# Patient Record
Sex: MALE | Race: WHITE | Employment: OTHER | URBAN - METROPOLITAN AREA
[De-identification: names, ages, dates, MRNs, and addresses within clinical notes are randomized per-mention and may not be internally consistent; named-entity substitution may affect disease eponyms.]

---

## 2017-02-28 ENCOUNTER — OFFICE VISIT (OUTPATIENT)
Dept: INTERNAL MEDICINE CLINIC | Facility: CLINIC | Age: 72
End: 2017-02-28

## 2017-02-28 VITALS
TEMPERATURE: 99 F | DIASTOLIC BLOOD PRESSURE: 90 MMHG | SYSTOLIC BLOOD PRESSURE: 150 MMHG | BODY MASS INDEX: 33.62 KG/M2 | HEIGHT: 69 IN | WEIGHT: 227 LBS | HEART RATE: 68 BPM

## 2017-02-28 DIAGNOSIS — Z00.00 ROUTINE HEALTH MAINTENANCE: Primary | ICD-10-CM

## 2017-02-28 DIAGNOSIS — R04.0 EPISTAXIS: ICD-10-CM

## 2017-02-28 DIAGNOSIS — Z12.5 SCREENING FOR PROSTATE CANCER: ICD-10-CM

## 2017-02-28 DIAGNOSIS — R20.2 PARESTHESIA OF BOTH FEET: ICD-10-CM

## 2017-02-28 DIAGNOSIS — E78.00 HYPERCHOLESTEROLEMIA: ICD-10-CM

## 2017-02-28 DIAGNOSIS — I10 ESSENTIAL HYPERTENSION: ICD-10-CM

## 2017-02-28 DIAGNOSIS — Z12.11 SCREENING FOR COLON CANCER: ICD-10-CM

## 2017-02-28 DIAGNOSIS — F17.210 SMOKING GREATER THAN 40 PACK YEARS: ICD-10-CM

## 2017-02-28 PROCEDURE — G0009 ADMIN PNEUMOCOCCAL VACCINE: HCPCS | Performed by: INTERNAL MEDICINE

## 2017-02-28 PROCEDURE — 99397 PER PM REEVAL EST PAT 65+ YR: CPT | Performed by: INTERNAL MEDICINE

## 2017-02-28 PROCEDURE — 93005 ELECTROCARDIOGRAM TRACING: CPT | Performed by: INTERNAL MEDICINE

## 2017-02-28 PROCEDURE — 93010 ELECTROCARDIOGRAM REPORT: CPT | Performed by: INTERNAL MEDICINE

## 2017-02-28 PROCEDURE — 90670 PCV13 VACCINE IM: CPT | Performed by: INTERNAL MEDICINE

## 2017-02-28 RX ORDER — LOSARTAN POTASSIUM 50 MG/1
50 TABLET ORAL DAILY
Qty: 30 TABLET | Refills: 6 | Status: SHIPPED | OUTPATIENT
Start: 2017-02-28 | End: 2017-03-08 | Stop reason: DRUGHIGH

## 2017-02-28 RX ORDER — FLUTICASONE PROPIONATE 50 MCG
SPRAY, SUSPENSION (ML) NASAL DAILY
COMMUNITY

## 2017-02-28 NOTE — PROGRESS NOTES
Abby Joya is a 73year old malePatient presents with:  Establish Care    HPI:     Abby Joya is a 73year old male who presents for a complete physical exam.   Here to establish. Prev saw Dr. Jeaneth Cr.   Referred by daughter Brigida Velez  Had frequent e changes in stream  NEURO: denies headaches or dizziness      EXAM:   /90 mmHg  Pulse 68  Temp(Src) 98.5 °F (36.9 °C) (Oral)  Ht 5' 9\" (1.753 m)  Wt 227 lb (102.967 kg)  BMI 33.51 kg/m2  GENERAL: well developed, well nourished, in no apparent distres

## 2017-03-01 ENCOUNTER — LAB ENCOUNTER (OUTPATIENT)
Dept: LAB | Age: 72
End: 2017-03-01
Attending: INTERNAL MEDICINE
Payer: MEDICARE

## 2017-03-01 DIAGNOSIS — R04.0 EPISTAXIS: ICD-10-CM

## 2017-03-01 DIAGNOSIS — E78.00 HYPERCHOLESTEROLEMIA: ICD-10-CM

## 2017-03-01 DIAGNOSIS — Z12.5 SCREENING FOR PROSTATE CANCER: ICD-10-CM

## 2017-03-01 DIAGNOSIS — R20.2 PARESTHESIA OF BOTH FEET: ICD-10-CM

## 2017-03-01 LAB
ALBUMIN SERPL BCP-MCNC: 3.6 G/DL (ref 3.5–4.8)
ALBUMIN/GLOB SERPL: 0.8 {RATIO} (ref 1–2)
ALP SERPL-CCNC: 47 U/L (ref 32–100)
ALT SERPL-CCNC: 24 U/L (ref 17–63)
ANION GAP SERPL CALC-SCNC: 7 MMOL/L (ref 0–18)
AST SERPL-CCNC: 18 U/L (ref 15–41)
BASOPHILS # BLD: 0 K/UL (ref 0–0.2)
BASOPHILS NFR BLD: 1 %
BILIRUB SERPL-MCNC: 0.7 MG/DL (ref 0.3–1.2)
BUN SERPL-MCNC: 13 MG/DL (ref 8–20)
BUN/CREAT SERPL: 11.4 (ref 10–20)
CALCIUM SERPL-MCNC: 9.1 MG/DL (ref 8.5–10.5)
CHLORIDE SERPL-SCNC: 103 MMOL/L (ref 95–110)
CHOLEST SERPL-MCNC: 166 MG/DL (ref 110–200)
CO2 SERPL-SCNC: 28 MMOL/L (ref 22–32)
CREAT SERPL-MCNC: 1.14 MG/DL (ref 0.5–1.5)
EOSINOPHIL # BLD: 0.2 K/UL (ref 0–0.7)
EOSINOPHIL NFR BLD: 4 %
ERYTHROCYTE [DISTWIDTH] IN BLOOD BY AUTOMATED COUNT: 14 % (ref 11–15)
GLOBULIN PLAS-MCNC: 4.3 G/DL (ref 2.5–3.7)
GLUCOSE SERPL-MCNC: 112 MG/DL (ref 70–99)
HBA1C MFR BLD: 5.9 % (ref 4–6)
HCT VFR BLD AUTO: 44.3 % (ref 41–52)
HDLC SERPL-MCNC: 29 MG/DL
HGB BLD-MCNC: 15 G/DL (ref 13.5–17.5)
LDLC SERPL CALC-MCNC: 101 MG/DL (ref 0–99)
LYMPHOCYTES # BLD: 2.3 K/UL (ref 1–4)
LYMPHOCYTES NFR BLD: 44 %
MCH RBC QN AUTO: 31.1 PG (ref 27–32)
MCHC RBC AUTO-ENTMCNC: 33.9 G/DL (ref 32–37)
MCV RBC AUTO: 91.6 FL (ref 80–100)
MONOCYTES # BLD: 0.4 K/UL (ref 0–1)
MONOCYTES NFR BLD: 7 %
NEUTROPHILS # BLD AUTO: 2.3 K/UL (ref 1.8–7.7)
NEUTROPHILS NFR BLD: 44 %
NONHDLC SERPL-MCNC: 137 MG/DL
OSMOLALITY UR CALC.SUM OF ELEC: 287 MOSM/KG (ref 275–295)
PLATELET # BLD AUTO: 138 K/UL (ref 140–400)
PMV BLD AUTO: 8.4 FL (ref 7.4–10.3)
POTASSIUM SERPL-SCNC: 3.9 MMOL/L (ref 3.3–5.1)
PROT SERPL-MCNC: 7.9 G/DL (ref 5.9–8.4)
PSA SERPL-MCNC: 1.3 NG/ML (ref 0–4)
RBC # BLD AUTO: 4.84 M/UL (ref 4.5–5.9)
SODIUM SERPL-SCNC: 138 MMOL/L (ref 136–144)
TRIGL SERPL-MCNC: 182 MG/DL (ref 1–149)
TSH SERPL-ACNC: 1.82 UIU/ML (ref 0.34–5.6)
VIT B12 SERPL-MCNC: 206 PG/ML (ref 181–914)
WBC # BLD AUTO: 5.1 K/UL (ref 4–11)

## 2017-03-01 PROCEDURE — 36415 COLL VENOUS BLD VENIPUNCTURE: CPT

## 2017-03-01 PROCEDURE — 85025 COMPLETE CBC W/AUTO DIFF WBC: CPT

## 2017-03-01 PROCEDURE — 80053 COMPREHEN METABOLIC PANEL: CPT

## 2017-03-01 PROCEDURE — 80061 LIPID PANEL: CPT

## 2017-03-01 PROCEDURE — 82607 VITAMIN B-12: CPT

## 2017-03-01 PROCEDURE — 83036 HEMOGLOBIN GLYCOSYLATED A1C: CPT | Performed by: INTERNAL MEDICINE

## 2017-03-01 PROCEDURE — 84443 ASSAY THYROID STIM HORMONE: CPT

## 2017-03-08 ENCOUNTER — OFFICE VISIT (OUTPATIENT)
Dept: INTERNAL MEDICINE CLINIC | Facility: CLINIC | Age: 72
End: 2017-03-08

## 2017-03-08 VITALS
HEART RATE: 64 BPM | WEIGHT: 226 LBS | DIASTOLIC BLOOD PRESSURE: 92 MMHG | SYSTOLIC BLOOD PRESSURE: 142 MMHG | HEIGHT: 69 IN | TEMPERATURE: 98 F | BODY MASS INDEX: 33.47 KG/M2

## 2017-03-08 DIAGNOSIS — D69.6 THROMBOCYTOPENIA (HCC): ICD-10-CM

## 2017-03-08 DIAGNOSIS — E78.00 HYPERCHOLESTEROLEMIA: ICD-10-CM

## 2017-03-08 DIAGNOSIS — E53.8 VITAMIN B12 DEFICIENCY: ICD-10-CM

## 2017-03-08 DIAGNOSIS — R73.03 PREDIABETES: ICD-10-CM

## 2017-03-08 DIAGNOSIS — R73.9 HYPERGLYCEMIA: ICD-10-CM

## 2017-03-08 DIAGNOSIS — I10 ESSENTIAL HYPERTENSION: Primary | ICD-10-CM

## 2017-03-08 PROCEDURE — G0463 HOSPITAL OUTPT CLINIC VISIT: HCPCS | Performed by: INTERNAL MEDICINE

## 2017-03-08 PROCEDURE — 99215 OFFICE O/P EST HI 40 MIN: CPT | Performed by: INTERNAL MEDICINE

## 2017-03-08 RX ORDER — LOSARTAN POTASSIUM 100 MG/1
100 TABLET ORAL DAILY
Qty: 30 TABLET | Refills: 11 | Status: SHIPPED | OUTPATIENT
Start: 2017-03-08 | End: 2018-03-13

## 2017-03-08 RX ORDER — ASPIRIN 81 MG/1
81 TABLET ORAL DAILY
Qty: 1 TABLET | Refills: 0 | COMMUNITY
Start: 2017-03-08

## 2017-03-08 RX ORDER — ATORVASTATIN CALCIUM 20 MG/1
20 TABLET, FILM COATED ORAL NIGHTLY
Qty: 30 TABLET | Refills: 11 | Status: SHIPPED | OUTPATIENT
Start: 2017-03-08 | End: 2018-03-19

## 2017-03-08 RX ORDER — MELATONIN
1000 DAILY
Qty: 30 TABLET | Refills: 0 | COMMUNITY
Start: 2017-03-08

## 2017-03-08 NOTE — PROGRESS NOTES
Raymundo Woods is a 73year old male. Patient presents with: Follow - Up: Patient is here today for a 1 week f/u. His blood pressure has been elevated at home. Patient denies any CP, dizziness, SOB, or HA's at this time. HPI:     Here for f/u of HTN.   Vi with multiple cardiac risk factors -- HTN, smoking, prediabetes, age. Recommend Lipitor 20mg/day. Repeat lipids in 3 months. 5. Thrombocytopenia  Platelets slightly low 138 -- check repeat CBC in 3 mos. 45 min spent reviewing labs, counseling.     R

## 2017-06-21 ENCOUNTER — OFFICE VISIT (OUTPATIENT)
Dept: INTERNAL MEDICINE CLINIC | Facility: CLINIC | Age: 72
End: 2017-06-21

## 2017-06-21 VITALS
BODY MASS INDEX: 33.18 KG/M2 | HEART RATE: 57 BPM | WEIGHT: 224 LBS | OXYGEN SATURATION: 96 % | SYSTOLIC BLOOD PRESSURE: 142 MMHG | DIASTOLIC BLOOD PRESSURE: 88 MMHG | HEIGHT: 69 IN | TEMPERATURE: 98 F

## 2017-06-21 DIAGNOSIS — I10 ESSENTIAL HYPERTENSION: Primary | ICD-10-CM

## 2017-06-21 DIAGNOSIS — E53.8 VITAMIN B12 DEFICIENCY: ICD-10-CM

## 2017-06-21 DIAGNOSIS — E78.00 HYPERCHOLESTEROLEMIA: ICD-10-CM

## 2017-06-21 DIAGNOSIS — R73.03 PREDIABETES: ICD-10-CM

## 2017-06-21 PROCEDURE — 99214 OFFICE O/P EST MOD 30 MIN: CPT | Performed by: INTERNAL MEDICINE

## 2017-06-21 PROCEDURE — G0463 HOSPITAL OUTPT CLINIC VISIT: HCPCS | Performed by: INTERNAL MEDICINE

## 2017-06-21 RX ORDER — AMLODIPINE BESYLATE 5 MG/1
5 TABLET ORAL DAILY
Qty: 30 TABLET | Refills: 11 | Status: SHIPPED | OUTPATIENT
Start: 2017-06-21 | End: 2017-07-12

## 2017-06-21 NOTE — PROGRESS NOTES
Snow Andrews is a 73year old male. Patient presents with:  Checkup: Patient presents for 3 month OV. HPI:     Here for follow up. BP at home 150's/90's. Did not get labs done in [place]. Feels okay. Paresthesias in feet are less.          Current Outpati hypertension  On losartan 100mg/day since 3/2017 -- did not do the follow up BMP as ordered. Add norvasc 5mg daily. 2. Prediabetes  Pt reminded to do repeat HgbA1c. Discussed low carb diet, wt loss at length.  Reminded to see Dr. Kenia osorio.

## 2017-06-22 ENCOUNTER — LAB ENCOUNTER (OUTPATIENT)
Dept: LAB | Age: 72
End: 2017-06-22
Attending: INTERNAL MEDICINE
Payer: MEDICARE

## 2017-06-22 DIAGNOSIS — E78.00 HYPERCHOLESTEROLEMIA: ICD-10-CM

## 2017-06-22 DIAGNOSIS — I10 ESSENTIAL HYPERTENSION: ICD-10-CM

## 2017-06-22 DIAGNOSIS — E53.8 VITAMIN B12 DEFICIENCY: ICD-10-CM

## 2017-06-22 DIAGNOSIS — R73.9 HYPERGLYCEMIA: ICD-10-CM

## 2017-06-22 DIAGNOSIS — D69.6 THROMBOCYTOPENIA (HCC): ICD-10-CM

## 2017-06-22 PROCEDURE — 80061 LIPID PANEL: CPT

## 2017-06-22 PROCEDURE — 82043 UR ALBUMIN QUANTITATIVE: CPT

## 2017-06-22 PROCEDURE — 36415 COLL VENOUS BLD VENIPUNCTURE: CPT

## 2017-06-22 PROCEDURE — 82570 ASSAY OF URINE CREATININE: CPT

## 2017-06-22 PROCEDURE — 85025 COMPLETE CBC W/AUTO DIFF WBC: CPT

## 2017-06-22 PROCEDURE — 83036 HEMOGLOBIN GLYCOSYLATED A1C: CPT

## 2017-06-22 PROCEDURE — 84460 ALANINE AMINO (ALT) (SGPT): CPT

## 2017-06-22 PROCEDURE — 82607 VITAMIN B-12: CPT

## 2017-06-22 PROCEDURE — 84450 TRANSFERASE (AST) (SGOT): CPT

## 2017-06-22 PROCEDURE — 80048 BASIC METABOLIC PNL TOTAL CA: CPT

## 2017-07-05 ENCOUNTER — TELEPHONE (OUTPATIENT)
Dept: INTERNAL MEDICINE CLINIC | Facility: CLINIC | Age: 72
End: 2017-07-05

## 2017-07-05 DIAGNOSIS — R73.9 HYPERGLYCEMIA: Primary | ICD-10-CM

## 2017-07-05 DIAGNOSIS — E78.00 HYPERCHOLESTEROLEMIA: ICD-10-CM

## 2017-07-05 DIAGNOSIS — I10 ESSENTIAL HYPERTENSION: ICD-10-CM

## 2017-07-05 DIAGNOSIS — E53.8 VITAMIN B12 DEFICIENCY: ICD-10-CM

## 2017-07-05 DIAGNOSIS — Z11.59 NEED FOR HEPATITIS C SCREENING TEST: ICD-10-CM

## 2017-07-05 NOTE — TELEPHONE ENCOUNTER
Please let pt know his labs were much better. Vit B12 level better -- continue the daily vitamin B12. Lipids at goal on Lipitor. Platelets normalized (may have been low previously due to his low B12 level). HgbA1c stable at 5.9.   Pt has another appt in

## 2017-07-06 ENCOUNTER — OFFICE VISIT (OUTPATIENT)
Dept: OTOLARYNGOLOGY | Facility: CLINIC | Age: 72
End: 2017-07-06

## 2017-07-06 VITALS
HEART RATE: 66 BPM | WEIGHT: 225 LBS | DIASTOLIC BLOOD PRESSURE: 57 MMHG | TEMPERATURE: 98 F | HEIGHT: 69 IN | BODY MASS INDEX: 33.33 KG/M2 | RESPIRATION RATE: 16 BRPM | SYSTOLIC BLOOD PRESSURE: 107 MMHG

## 2017-07-06 DIAGNOSIS — R04.0 EPISTAXIS: Primary | ICD-10-CM

## 2017-07-06 PROCEDURE — G0463 HOSPITAL OUTPT CLINIC VISIT: HCPCS | Performed by: OTOLARYNGOLOGY

## 2017-07-06 PROCEDURE — 99203 OFFICE O/P NEW LOW 30 MIN: CPT | Performed by: OTOLARYNGOLOGY

## 2017-07-07 NOTE — PATIENT INSTRUCTIONS
Nosebleed (Adult)    Bleeding from the nose most commonly occurs because of injury or drying and cracking of the inner lining of the nose. Most nosebleeds are because of dry air or nose-picking. They can occur during a common cold or an allergy attack.  Parmjit Krishnamurthy · If the bleeding starts again, sit up and lean forward to prevent swallowing blood. Pinch your nose tightly on both sides, as shown above, for 10 to 15 minutes. Time yourself. Don’t release the pressure on your nose until 10 minutes is up.  If bleeding norton

## 2017-07-07 NOTE — PROGRESS NOTES
Shelia Fritz is a 73year old male. Patient presents with:  Nose Problem: Patient c/o epitaxis to bilateral nostrils for past 5 years. HPI:   For many years he's been experiencing problems with blood and crusted material in his nose.  This causes him so 225 lb (102.1 kg)   BMI 33.23 kg/m²   System Findings Details   Skin Normal Inspection - Normal.   Constitutional Normal Overall appearance - Normal.   Head/Face Normal Facial features - Normal. Eyebrows - Normal. Skull - Normal.   Oral/Oropharynx Normal L

## 2017-07-11 ENCOUNTER — TELEPHONE (OUTPATIENT)
Dept: INTERNAL MEDICINE CLINIC | Facility: CLINIC | Age: 72
End: 2017-07-11

## 2017-07-11 DIAGNOSIS — R73.03 PREDIABETES: Primary | ICD-10-CM

## 2017-07-11 NOTE — TELEPHONE ENCOUNTER
Pt is being seen by Dr Fabiano Larsen now pt has [employer] HMO he needs a referral please fax [phone], phone [phone]    Tasked to nursing high   Message given to [facility]

## 2017-07-11 NOTE — TELEPHONE ENCOUNTER
Referral entered per Dr. Ashwin Arrieta last OV note. Auto-authorized for [facility] patient. Faxed to requested number.

## 2017-07-12 RX ORDER — AMLODIPINE BESYLATE 5 MG/1
10 TABLET ORAL DAILY
Qty: 180 TABLET | Refills: 3 | Status: SHIPPED | OUTPATIENT
Start: 2017-07-12 | End: 2017-09-20 | Stop reason: DRUGHIGH

## 2017-07-12 NOTE — TELEPHONE ENCOUNTER
Please let pt know that I received his BP readings -- better but still high (130's-140's/high 80's-90's). I'd like him to increase the amlodipine from 5 to 10mg/day. Continue the losartan 100mg/day. He may need a Rx for the 10mg amlodipine tablets.   Ple

## 2017-07-12 NOTE — TELEPHONE ENCOUNTER
Pt notified b/p better but still high / DR LUCERO   To increase amlodipine from 5 mg to 10 mg daily   Continue losartan 100 mg daily  New rx sent for amlodipine 10 mg sent to [facility]  Please send b/p reading in 2-3 wks to DR LUCERO   Pt understands all  instructions

## 2017-07-25 ENCOUNTER — OFFICE VISIT (OUTPATIENT)
Dept: OTOLARYNGOLOGY | Facility: CLINIC | Age: 72
End: 2017-07-25

## 2017-07-25 VITALS
HEIGHT: 69 IN | SYSTOLIC BLOOD PRESSURE: 106 MMHG | WEIGHT: 225 LBS | BODY MASS INDEX: 33.33 KG/M2 | DIASTOLIC BLOOD PRESSURE: 70 MMHG | TEMPERATURE: 98 F

## 2017-07-25 DIAGNOSIS — R04.0 EPISTAXIS: Primary | ICD-10-CM

## 2017-07-25 PROCEDURE — G0463 HOSPITAL OUTPT CLINIC VISIT: HCPCS | Performed by: OTOLARYNGOLOGY

## 2017-07-25 PROCEDURE — 99213 OFFICE O/P EST LOW 20 MIN: CPT | Performed by: OTOLARYNGOLOGY

## 2017-07-25 NOTE — PROGRESS NOTES
Chong Davila is a 73year old male. Patient presents with: Follow - Up: 3 week follow up- epistaxis- per pt he still experienced nose bleed    HPI:   He has been doing much better. The bleeding has decreased signifiantly.      Current Outpatient Prescripti less crusting. , Turbinates - Normal   Neurological Normal Memory - Normal. Cranial nerves - Cranial nerves II through XII grossly intact.    Neck Exam Normal Inspection - Normal. Palpation - Normal. Parotid gland - Normal. Thyroid gland - Normal.   Psychia

## 2017-07-25 NOTE — PATIENT INSTRUCTIONS
Nosebleed (Adult)    Bleeding from the nose most commonly occurs because of injury or drying and cracking of the inner lining of the nose. Most nosebleeds are because of dry air or nose-picking. They can occur during a common cold or an allergy attack.  Dionne French · If the bleeding starts again, sit up and lean forward to prevent swallowing blood. Pinch your nose tightly on both sides, as shown above, for 10 to 15 minutes. Time yourself. Don’t release the pressure on your nose until 10 minutes is up.  If bleeding norton

## 2017-07-27 PROCEDURE — 88305 TISSUE EXAM BY PATHOLOGIST: CPT | Performed by: INTERNAL MEDICINE

## 2017-08-01 PROBLEM — Z86.010 HISTORY OF ADENOMATOUS POLYP OF COLON: Status: ACTIVE | Noted: 2017-08-01

## 2017-09-20 ENCOUNTER — OFFICE VISIT (OUTPATIENT)
Dept: INTERNAL MEDICINE CLINIC | Facility: CLINIC | Age: 72
End: 2017-09-20

## 2017-09-20 VITALS
BODY MASS INDEX: 33.18 KG/M2 | TEMPERATURE: 99 F | SYSTOLIC BLOOD PRESSURE: 102 MMHG | HEART RATE: 78 BPM | WEIGHT: 224 LBS | DIASTOLIC BLOOD PRESSURE: 76 MMHG | HEIGHT: 69 IN

## 2017-09-20 DIAGNOSIS — R06.83 SNORING: ICD-10-CM

## 2017-09-20 DIAGNOSIS — I10 ESSENTIAL HYPERTENSION: Primary | ICD-10-CM

## 2017-09-20 PROCEDURE — G0463 HOSPITAL OUTPT CLINIC VISIT: HCPCS | Performed by: INTERNAL MEDICINE

## 2017-09-20 PROCEDURE — 99214 OFFICE O/P EST MOD 30 MIN: CPT | Performed by: INTERNAL MEDICINE

## 2017-09-20 RX ORDER — AMLODIPINE BESYLATE 10 MG/1
10 TABLET ORAL DAILY
Qty: 90 TABLET | Refills: 3 | Status: SHIPPED | OUTPATIENT
Start: 2017-09-20 | End: 2018-10-14

## 2017-09-20 NOTE — PROGRESS NOTES
Aleksey Joyce is a 73year old male. Patient presents with:  Blood Pressure: patient states he is feeling fine    HPI:     Here with his wife for f/u of BP. Norvasc was increased from 5 to 10mg/day.   BP at home 120's-140's/80's-90's, although his BP machine index is 33.08 kg/m².       EXAM:   /76 (BP Location: Left arm, Patient Position: Sitting, Cuff Size: large)   Pulse 78   Temp 98.7 °F (37.1 °C) (Oral)   Ht 5' 9\" (1.753 m)   Wt 224 lb (101.6 kg)   BMI 33.08 kg/m²   GENERAL: well developed, well nour

## 2017-12-01 ENCOUNTER — TELEPHONE (OUTPATIENT)
Dept: INTERNAL MEDICINE CLINIC | Facility: CLINIC | Age: 72
End: 2017-12-01

## 2017-12-01 NOTE — TELEPHONE ENCOUNTER
To Dr. Levon May - see below - these are 2 most recent episodes, have occurred on occasion in past as well. Please advise.

## 2017-12-01 NOTE — TELEPHONE ENCOUNTER
Py has history of HTN and currently takes losartan 100mg/day and norvasc 10mg/day; advise hold losartan if light-headedness persists, and advise see Dr Woody Donaldson next week for follow-up if sxs persist; please call pt; FYI to Dr Woody Donaldson; to nursing--> please call pt

## 2017-12-01 NOTE — TELEPHONE ENCOUNTER
Please call pt  Has been taking BP, reading today normal 117/74, but when he got up after taking he was light headed  Sunday also felt that way after riding in car for awhile and then standing up  Please call to discuss/advise  Tasked to nursing

## 2017-12-18 ENCOUNTER — TELEPHONE (OUTPATIENT)
Dept: INTERNAL MEDICINE CLINIC | Facility: CLINIC | Age: 72
End: 2017-12-18

## 2017-12-18 ENCOUNTER — APPOINTMENT (OUTPATIENT)
Dept: LAB | Age: 72
End: 2017-12-18
Attending: INTERNAL MEDICINE
Payer: MEDICARE

## 2017-12-18 ENCOUNTER — LAB ENCOUNTER (OUTPATIENT)
Dept: LAB | Age: 72
End: 2017-12-18
Attending: INTERNAL MEDICINE
Payer: MEDICARE

## 2017-12-18 DIAGNOSIS — E78.00 HYPERCHOLESTEROLEMIA: ICD-10-CM

## 2017-12-18 DIAGNOSIS — I10 ESSENTIAL HYPERTENSION: ICD-10-CM

## 2017-12-18 DIAGNOSIS — Z11.59 NEED FOR HEPATITIS C SCREENING TEST: ICD-10-CM

## 2017-12-18 DIAGNOSIS — R73.9 HYPERGLYCEMIA: ICD-10-CM

## 2017-12-18 DIAGNOSIS — R82.90 ABNORMAL URINALYSIS: ICD-10-CM

## 2017-12-18 DIAGNOSIS — R82.90 ABNORMAL URINALYSIS: Primary | ICD-10-CM

## 2017-12-18 PROCEDURE — 80048 BASIC METABOLIC PNL TOTAL CA: CPT

## 2017-12-18 PROCEDURE — 82570 ASSAY OF URINE CREATININE: CPT

## 2017-12-18 PROCEDURE — 81001 URINALYSIS AUTO W/SCOPE: CPT

## 2017-12-18 PROCEDURE — 36415 COLL VENOUS BLD VENIPUNCTURE: CPT

## 2017-12-18 PROCEDURE — 82043 UR ALBUMIN QUANTITATIVE: CPT

## 2017-12-18 PROCEDURE — 80061 LIPID PANEL: CPT

## 2017-12-18 PROCEDURE — 86803 HEPATITIS C AB TEST: CPT

## 2017-12-18 PROCEDURE — 87086 URINE CULTURE/COLONY COUNT: CPT

## 2017-12-18 PROCEDURE — 83036 HEMOGLOBIN GLYCOSYLATED A1C: CPT

## 2017-12-18 NOTE — TELEPHONE ENCOUNTER
The only available result for now is a regular urinalysis. He does have 20 white cells. Please call patient to see if he has any UTI symptoms. We should get a urine culture. Order in place.

## 2017-12-18 NOTE — TELEPHONE ENCOUNTER
Please advise - called patient and relayed DR. DON message and he verbalized understanding > He has occassional burning on tip of penis to DR. DON

## 2017-12-20 ENCOUNTER — OFFICE VISIT (OUTPATIENT)
Dept: INTERNAL MEDICINE CLINIC | Facility: CLINIC | Age: 72
End: 2017-12-20

## 2017-12-20 VITALS
SYSTOLIC BLOOD PRESSURE: 124 MMHG | TEMPERATURE: 99 F | BODY MASS INDEX: 33.33 KG/M2 | WEIGHT: 225 LBS | DIASTOLIC BLOOD PRESSURE: 70 MMHG | HEART RATE: 80 BPM | HEIGHT: 69 IN

## 2017-12-20 DIAGNOSIS — Z12.5 SCREENING FOR MALIGNANT NEOPLASM OF PROSTATE: ICD-10-CM

## 2017-12-20 DIAGNOSIS — E78.00 HYPERCHOLESTEROLEMIA: ICD-10-CM

## 2017-12-20 DIAGNOSIS — E53.8 VITAMIN B12 DEFICIENCY: ICD-10-CM

## 2017-12-20 DIAGNOSIS — R73.02 IMPAIRED GLUCOSE TOLERANCE: ICD-10-CM

## 2017-12-20 DIAGNOSIS — Z00.00 ROUTINE HEALTH MAINTENANCE: Primary | ICD-10-CM

## 2017-12-20 DIAGNOSIS — Z86.010 HISTORY OF ADENOMATOUS POLYP OF COLON: ICD-10-CM

## 2017-12-20 DIAGNOSIS — I10 ESSENTIAL HYPERTENSION: ICD-10-CM

## 2017-12-20 PROCEDURE — 90471 IMMUNIZATION ADMIN: CPT | Performed by: INTERNAL MEDICINE

## 2017-12-20 PROCEDURE — 99214 OFFICE O/P EST MOD 30 MIN: CPT | Performed by: INTERNAL MEDICINE

## 2017-12-20 PROCEDURE — 90715 TDAP VACCINE 7 YRS/> IM: CPT | Performed by: INTERNAL MEDICINE

## 2017-12-20 NOTE — PROGRESS NOTES
Jeovany Josue is a 81year old male. Patient presents with:  Checkup: Patient is here today for a 3 month checkup. He states that he has been feeling good lately. He deals with lower back pain on/off. This is a chronic issue. Pain comes and goes.  Naproxen/Ad Location: Left arm, Patient Position: Sitting)   Pulse 80   Temp 98.6 °F (37 °C) (Oral)   Ht 5' 9\" (1.753 m)   Wt 225 lb (102.1 kg)   BMI 33.23 kg/m²   GENERAL: well developed, well nourished, in no apparent distress  HEENT: normal oropharynx, normal TM's

## 2018-03-13 RX ORDER — LOSARTAN POTASSIUM 100 MG/1
TABLET ORAL
Qty: 90 TABLET | Refills: 3 | Status: SHIPPED | OUTPATIENT
Start: 2018-03-13

## 2018-03-19 RX ORDER — ATORVASTATIN CALCIUM 20 MG/1
TABLET, FILM COATED ORAL
Qty: 90 TABLET | Refills: 3 | Status: SHIPPED | OUTPATIENT
Start: 2018-03-19

## 2018-06-25 ENCOUNTER — LAB ENCOUNTER (OUTPATIENT)
Dept: LAB | Age: 73
End: 2018-06-25
Attending: INTERNAL MEDICINE
Payer: MEDICARE

## 2018-06-25 DIAGNOSIS — E78.00 HYPERCHOLESTEROLEMIA: ICD-10-CM

## 2018-06-25 DIAGNOSIS — I10 ESSENTIAL HYPERTENSION: ICD-10-CM

## 2018-06-25 DIAGNOSIS — Z12.5 SCREENING FOR MALIGNANT NEOPLASM OF PROSTATE: ICD-10-CM

## 2018-06-25 DIAGNOSIS — E53.8 VITAMIN B12 DEFICIENCY: ICD-10-CM

## 2018-06-25 DIAGNOSIS — R73.02 IMPAIRED GLUCOSE TOLERANCE: ICD-10-CM

## 2018-06-25 PROCEDURE — 80053 COMPREHEN METABOLIC PANEL: CPT

## 2018-06-25 PROCEDURE — 36415 COLL VENOUS BLD VENIPUNCTURE: CPT

## 2018-06-25 PROCEDURE — 85025 COMPLETE CBC W/AUTO DIFF WBC: CPT

## 2018-06-25 PROCEDURE — 83036 HEMOGLOBIN GLYCOSYLATED A1C: CPT

## 2018-06-25 PROCEDURE — 82570 ASSAY OF URINE CREATININE: CPT

## 2018-06-25 PROCEDURE — 81001 URINALYSIS AUTO W/SCOPE: CPT

## 2018-06-25 PROCEDURE — 82043 UR ALBUMIN QUANTITATIVE: CPT

## 2018-06-25 PROCEDURE — 82607 VITAMIN B-12: CPT

## 2018-06-25 PROCEDURE — 80061 LIPID PANEL: CPT

## 2018-06-26 PROBLEM — N18.1 STAGE 1 CHRONIC KIDNEY DISEASE: Status: ACTIVE | Noted: 2018-06-26

## 2018-06-26 PROBLEM — D64.9 ANEMIA: Status: ACTIVE | Noted: 2018-06-26

## 2018-06-26 PROBLEM — E66.2 CLASS 1 OBESITY WITH ALVEOLAR HYPOVENTILATION, SERIOUS COMORBIDITY, AND BODY MASS INDEX (BMI) OF 33.0 TO 33.9 IN ADULT (HCC): Status: ACTIVE | Noted: 2018-06-26

## 2018-06-27 ENCOUNTER — LAB ENCOUNTER (OUTPATIENT)
Dept: LAB | Age: 73
End: 2018-06-27
Attending: INTERNAL MEDICINE
Payer: MEDICARE

## 2018-06-27 ENCOUNTER — TELEPHONE (OUTPATIENT)
Dept: INTERNAL MEDICINE CLINIC | Facility: CLINIC | Age: 73
End: 2018-06-27

## 2018-06-27 ENCOUNTER — OFFICE VISIT (OUTPATIENT)
Dept: INTERNAL MEDICINE CLINIC | Facility: CLINIC | Age: 73
End: 2018-06-27

## 2018-06-27 VITALS
DIASTOLIC BLOOD PRESSURE: 72 MMHG | WEIGHT: 226.38 LBS | TEMPERATURE: 98 F | SYSTOLIC BLOOD PRESSURE: 134 MMHG | HEART RATE: 70 BPM | OXYGEN SATURATION: 97 % | HEIGHT: 69 IN | BODY MASS INDEX: 33.53 KG/M2

## 2018-06-27 DIAGNOSIS — R73.03 PREDIABETES: ICD-10-CM

## 2018-06-27 DIAGNOSIS — E53.8 VITAMIN B12 DEFICIENCY: ICD-10-CM

## 2018-06-27 DIAGNOSIS — R73.9 HYPERGLYCEMIA: ICD-10-CM

## 2018-06-27 DIAGNOSIS — R77.1 ELEVATED SERUM GLOBULIN LEVEL: ICD-10-CM

## 2018-06-27 DIAGNOSIS — Z86.010 HISTORY OF ADENOMATOUS POLYP OF COLON: ICD-10-CM

## 2018-06-27 DIAGNOSIS — Z11.59 NEED FOR HEPATITIS C SCREENING TEST: Primary | ICD-10-CM

## 2018-06-27 DIAGNOSIS — R97.20 ELEVATED PSA: Primary | ICD-10-CM

## 2018-06-27 DIAGNOSIS — Z00.00 ROUTINE HEALTH MAINTENANCE: Primary | ICD-10-CM

## 2018-06-27 DIAGNOSIS — I10 ESSENTIAL HYPERTENSION: ICD-10-CM

## 2018-06-27 DIAGNOSIS — D64.9 ANEMIA, UNSPECIFIED TYPE: ICD-10-CM

## 2018-06-27 DIAGNOSIS — I10 HYPERTENSION: ICD-10-CM

## 2018-06-27 DIAGNOSIS — E78.00 HYPERCHOLESTEROLEMIA: ICD-10-CM

## 2018-06-27 PROBLEM — D69.6 THROMBOCYTOPENIA (HCC): Status: ACTIVE | Noted: 2018-06-27

## 2018-06-27 PROCEDURE — 82728 ASSAY OF FERRITIN: CPT

## 2018-06-27 PROCEDURE — G0009 ADMIN PNEUMOCOCCAL VACCINE: HCPCS | Performed by: INTERNAL MEDICINE

## 2018-06-27 PROCEDURE — 82043 UR ALBUMIN QUANTITATIVE: CPT

## 2018-06-27 PROCEDURE — 96160 PT-FOCUSED HLTH RISK ASSMT: CPT | Performed by: INTERNAL MEDICINE

## 2018-06-27 PROCEDURE — 84165 PROTEIN E-PHORESIS SERUM: CPT

## 2018-06-27 PROCEDURE — 84466 ASSAY OF TRANSFERRIN: CPT

## 2018-06-27 PROCEDURE — 36415 COLL VENOUS BLD VENIPUNCTURE: CPT

## 2018-06-27 PROCEDURE — 96372 THER/PROPH/DIAG INJ SC/IM: CPT | Performed by: INTERNAL MEDICINE

## 2018-06-27 PROCEDURE — 83036 HEMOGLOBIN GLYCOSYLATED A1C: CPT

## 2018-06-27 PROCEDURE — 82570 ASSAY OF URINE CREATININE: CPT

## 2018-06-27 PROCEDURE — 81001 URINALYSIS AUTO W/SCOPE: CPT

## 2018-06-27 PROCEDURE — 83540 ASSAY OF IRON: CPT

## 2018-06-27 PROCEDURE — 80048 BASIC METABOLIC PNL TOTAL CA: CPT

## 2018-06-27 PROCEDURE — 80061 LIPID PANEL: CPT

## 2018-06-27 PROCEDURE — G0439 PPPS, SUBSEQ VISIT: HCPCS | Performed by: INTERNAL MEDICINE

## 2018-06-27 PROCEDURE — 82746 ASSAY OF FOLIC ACID SERUM: CPT

## 2018-06-27 PROCEDURE — 84166 PROTEIN E-PHORESIS/URINE/CSF: CPT

## 2018-06-27 PROCEDURE — 90732 PPSV23 VACC 2 YRS+ SUBQ/IM: CPT | Performed by: INTERNAL MEDICINE

## 2018-06-27 PROCEDURE — 86803 HEPATITIS C AB TEST: CPT

## 2018-06-27 PROCEDURE — 86335 IMMUNFIX E-PHORSIS/URINE/CSF: CPT

## 2018-06-27 PROCEDURE — 86334 IMMUNOFIX E-PHORESIS SERUM: CPT

## 2018-06-27 RX ORDER — CYANOCOBALAMIN 1000 UG/ML
1000 INJECTION INTRAMUSCULAR; SUBCUTANEOUS ONCE
Status: COMPLETED | OUTPATIENT
Start: 2018-06-27 | End: 2018-06-27

## 2018-06-27 RX ADMIN — CYANOCOBALAMIN 1000 MCG: 1000 INJECTION INTRAMUSCULAR; SUBCUTANEOUS at 12:16:00

## 2018-06-27 NOTE — PROGRESS NOTES
Jose Villalta is a 81year old malePatient presents with: Well Adult: Physical    HPI:     Jose Villalta is a 81year old male who presents for a complete physical exam.     Feels well other than mild, intermittent recent chest congestion, nasal congestion. Quit date: 2/28/2014  Alcohol use: Yes           7.2 oz/week     Cans of beer: 12 per week     Comment: 1 bottle wine per week            General Health                                                   Functional Ability Fecal Occult Blood Annually No results found for: FOB, OCCULTSTOOL No flowsheet data found.     Glaucoma Screening      Ophthalmology Visit Annually     Prostate Cancer Screening      PSA  Annually PSA due on 06/25/2020  Update Health Maintenance if applica double vision  HEENT: denies nasal congestion, sinus pain or ST  LUNGS: denies shortness of breath or cough  CARDIOVASCULAR: denies chest pain or pressure or palpitations  GI: denies abdominal pain, N/V, diarrhea, constipation, hematochezia or melena  : onset anemia, however. 8. Anemia  New onset. Hgb 12.6 (was 13.9 in 6/2017). No melena, BRBPR. Check iron, folate.   Doubt due to mild B12 deficiency; poss related to elevated globulins (myeloma?)    RTC 6 mos if still in town; pt will find primary MD

## 2018-07-03 ENCOUNTER — TELEPHONE (OUTPATIENT)
Dept: INTERNAL MEDICINE CLINIC | Facility: CLINIC | Age: 73
End: 2018-07-03

## 2018-07-03 DIAGNOSIS — D47.2 MONOCLONAL GAMMOPATHY: Primary | ICD-10-CM

## 2018-07-04 NOTE — TELEPHONE ENCOUNTER
Spoke with pt re SPEP results -- IgG kappa monoclonal gammopathy. Pt with normal calcium but new onset anemia. Will check quant Ig's.   Pt referred to Dr. Abarca Click for eval.

## 2018-07-05 ENCOUNTER — APPOINTMENT (OUTPATIENT)
Dept: LAB | Age: 73
End: 2018-07-05
Attending: INTERNAL MEDICINE
Payer: MEDICARE

## 2018-07-05 DIAGNOSIS — D47.2 MONOCLONAL GAMMOPATHY: ICD-10-CM

## 2018-07-05 LAB
IGA SERPL-MCNC: <61 MG/DL (ref 68–378)
IGM SERPL-MCNC: <40 MG/DL (ref 60–263)
IMMUNOGLOBULIN PNL SER-MCNC: 4701 MG/DL (ref 694–1618)

## 2018-07-05 PROCEDURE — 36415 COLL VENOUS BLD VENIPUNCTURE: CPT

## 2018-07-05 PROCEDURE — 82784 ASSAY IGA/IGD/IGG/IGM EACH: CPT

## 2018-07-09 NOTE — TELEPHONE ENCOUNTER
To Dr. Mark Tipton - per your note below, referral was completed for Dr. Tony Elizondo - Dr. Oswald Weiner office contacted to notify, understanding verbalized.

## 2018-07-09 NOTE — TELEPHONE ENCOUNTER
Roxanne Corrales from Dr. Sol Thompson office called stating she can't schedule this pt. Until they get a written referral as the pt. Has [facility] insurance. Roxanne Corrales can be reached at [phone] if needed.

## 2018-07-12 RX ORDER — AMLODIPINE BESYLATE 5 MG/1
TABLET ORAL
Qty: 180 TABLET | Refills: 0 | OUTPATIENT
Start: 2018-07-12

## 2018-07-12 NOTE — TELEPHONE ENCOUNTER
Patient called to ask why his Rx was denied. Notified him that he is too early for RF and pharmacy should still have RF's on file. Last ordered with a year supply on 9/20/17 and the note to pharmacy states that patient will call when med needed.  Patient al

## 2018-07-27 ENCOUNTER — HOSPITAL ENCOUNTER (OUTPATIENT)
Dept: GENERAL RADIOLOGY | Facility: HOSPITAL | Age: 73
Discharge: HOME | End: 2018-07-27
Attending: INTERNAL MEDICINE
Payer: MEDICARE

## 2018-07-27 ENCOUNTER — OFFICE VISIT (OUTPATIENT)
Dept: HEMATOLOGY/ONCOLOGY | Facility: HOSPITAL | Age: 73
End: 2018-07-27
Attending: INTERNAL MEDICINE
Payer: MEDICARE

## 2018-07-27 VITALS
DIASTOLIC BLOOD PRESSURE: 87 MMHG | RESPIRATION RATE: 16 BRPM | HEART RATE: 61 BPM | BODY MASS INDEX: 33.62 KG/M2 | WEIGHT: 227 LBS | SYSTOLIC BLOOD PRESSURE: 144 MMHG | TEMPERATURE: 98 F | HEIGHT: 69 IN

## 2018-07-27 DIAGNOSIS — D47.2 MGUS (MONOCLONAL GAMMOPATHY OF UNKNOWN SIGNIFICANCE): Primary | ICD-10-CM

## 2018-07-27 DIAGNOSIS — D47.2 MONOCLONAL GAMMOPATHY OF UNKNOWN SIGNIFICANCE: ICD-10-CM

## 2018-07-27 PROBLEM — C61 PROSTATE CANCER (HCC): Status: ACTIVE | Noted: 2018-07-27

## 2018-07-27 LAB — LDH SERPL L TO P-CCNC: 98 U/L (ref 98–192)

## 2018-07-27 PROCEDURE — 77075 RADEX OSSEOUS SURVEY COMPL: CPT | Performed by: INTERNAL MEDICINE

## 2018-07-27 PROCEDURE — 99205 OFFICE O/P NEW HI 60 MIN: CPT | Performed by: INTERNAL MEDICINE

## 2018-07-27 NOTE — PROGRESS NOTES
HOME Adam is a 81year old male who is here today for evaluation of monoclonal proteinemia. Findings were initially noted on June 2018.   Patient was noted to have increased globulin protein CMP in 2017, by June of this year the globulin p intolerance. Genitourinary: Negative for dysuria, frequency, hematuria and urgency. Musculoskeletal: Negative for arthralgias, back pain, gait problem, myalgias and neck pain. Skin: Negative. Negative for color change and rash.         Skin tags and POSSIBLE POLYPECTOMY [id number];  Surgeon: Artemio Alexander MD;  Location: [address] [place] [address]    Social History  Social History   Marital status:   Spouse name: N/A    Years of education: N/A  Number of Eyes: Conjunctivae and EOM are normal. Pupils are equal, round, and reactive to light. No scleral icterus. Neck: Normal range of motion. Neck supple. No tracheal deviation present. No thyromegaly present.    Cardiovascular: Normal rate, regular rhythm a at PCP with mild anemia and CMP with nl creatinine, decreased albumin and calcium levels. Total protein levels elevated.     Discussed with the patient that given the markedly elevated IgG levels, and suppression of both the IgA and IgM, along with the ane (H)    Albumin      3.5 - 4.8 g/dL   3.3 (L)    GLOBULIN, TOTAL      2.5 - 3.7 g/dL   6.1 (H)    A/G RATIO      1.0 - 2.0   0.5 (L)    ANION GAP      0 - 18 mmol/L   4    BUN/CREA RATIO      10.0 - 20.0   13.4    CALCULATED OSMOLALITY      275 - 295 mOsm/k Status:  Final result Dx:  Elevated serum globulin level      Ref Range & Units 6/27/18 1220     Total Protein 6.5 - 9.1 g/dL 9.1    Albumin 3.75 - 5.21 g/dL 3.59     Alpha-1-Globulins 0.19 - 0.46 g/dL 0.27    Alpha-2-Globulins 0.48 - 1.05 g/dL 0.56    Bet

## 2018-07-28 LAB — BETA-2 MICROGLOBULIN, SERUM OR PLASMA: 3.6 MG/L

## 2018-07-30 ENCOUNTER — LAB ENCOUNTER (OUTPATIENT)
Dept: LAB | Facility: HOSPITAL | Age: 73
End: 2018-07-30
Attending: INTERNAL MEDICINE
Payer: MEDICARE

## 2018-07-30 DIAGNOSIS — D47.2 MGUS (MONOCLONAL GAMMOPATHY OF UNKNOWN SIGNIFICANCE): Primary | ICD-10-CM

## 2018-07-30 DIAGNOSIS — D47.2 MGUS (MONOCLONAL GAMMOPATHY OF UNKNOWN SIGNIFICANCE): ICD-10-CM

## 2018-07-30 LAB
PROT 24H UR-MRATE: 327.6 MG/24H (ref 0–150)
SPECIMEN VOL 24H UR: 1820 ML/24H

## 2018-07-30 PROCEDURE — 84166 PROTEIN E-PHORESIS/URINE/CSF: CPT

## 2018-07-30 PROCEDURE — 84156 ASSAY OF PROTEIN URINE: CPT

## 2018-07-30 PROCEDURE — 86335 IMMUNFIX E-PHORSIS/URINE/CSF: CPT

## 2018-07-31 ENCOUNTER — OFFICE VISIT (OUTPATIENT)
Dept: HEMATOLOGY/ONCOLOGY | Facility: HOSPITAL | Age: 73
End: 2018-07-31
Attending: INTERNAL MEDICINE
Payer: MEDICARE

## 2018-07-31 VITALS
TEMPERATURE: 98 F | BODY MASS INDEX: 33.33 KG/M2 | SYSTOLIC BLOOD PRESSURE: 133 MMHG | HEART RATE: 61 BPM | DIASTOLIC BLOOD PRESSURE: 64 MMHG | RESPIRATION RATE: 18 BRPM | HEIGHT: 69 IN | WEIGHT: 225 LBS

## 2018-07-31 DIAGNOSIS — D47.2 MGUS (MONOCLONAL GAMMOPATHY OF UNKNOWN SIGNIFICANCE): Primary | ICD-10-CM

## 2018-07-31 PROCEDURE — 38222 DX BONE MARROW BX & ASPIR: CPT | Performed by: INTERNAL MEDICINE

## 2018-07-31 NOTE — PROGRESS NOTES
Bone Marrow Procedure Note    Date of Service: 7/31/2018    Problem List:  Patient Active Problem List:     Routine health maintenance     Hypercholesterolemia     Essential hypertension     Prediabetes     Vitamin B12 deficiency     History of adenomatous flow cytometry and chromosome analysis. Then a bone marrow core biopsy was from the left superior posterior iliac crest.  Touch prints were made. Good hemostasis was obtained.   Pressure held and dermabond and dressing applied, wound instructions give

## 2018-07-31 NOTE — PATIENT INSTRUCTIONS
Post-Bone Marrow Biopsy Instructions:    1)  Leave dressing on until tomorrow morning. May remove tomorrow morning. While the dressing is on, keep it dry.     2)  Once the local anesthetic effect has worn out, you may take tylenol, aleve or ibuprofen for

## 2018-08-01 LAB
KAPPA FREE LIGHT CHAIN: 34.81 MG/DL (ref 0.33–1.94)
KAPPA/LAMBDA FLC RATIO: 54.05 (ref 0.26–1.65)
LAMBDA FREE LIGHT CHAIN: 0.64 MG/DL (ref 0.57–2.63)

## 2018-08-03 ENCOUNTER — TELEPHONE (OUTPATIENT)
Dept: HEMATOLOGY/ONCOLOGY | Facility: HOSPITAL | Age: 73
End: 2018-08-03

## 2018-08-03 NOTE — TELEPHONE ENCOUNTER
Gio Chavira called after hours asking for test results before Dr Forrest Royal for out of town.  alia

## 2018-08-03 NOTE — TELEPHONE ENCOUNTER
D/w patient and his wife and daughter that he has smoldering myeloma. Discussed that recommend surveillance every 3 mo with labs. Will meet next appt and discuss treatment.

## 2018-08-21 ENCOUNTER — OFFICE VISIT (OUTPATIENT)
Dept: HEMATOLOGY/ONCOLOGY | Facility: HOSPITAL | Age: 73
End: 2018-08-21
Attending: INTERNAL MEDICINE
Payer: MEDICARE

## 2018-08-21 VITALS
WEIGHT: 227 LBS | SYSTOLIC BLOOD PRESSURE: 138 MMHG | DIASTOLIC BLOOD PRESSURE: 79 MMHG | BODY MASS INDEX: 33.62 KG/M2 | HEART RATE: 64 BPM | TEMPERATURE: 98 F | HEIGHT: 69 IN | RESPIRATION RATE: 18 BRPM

## 2018-08-21 DIAGNOSIS — D47.2 SMOLDERING MULTIPLE MYELOMA (SMM): Primary | ICD-10-CM

## 2018-08-21 LAB
BASOPHILS # BLD: 0 K/UL (ref 0–0.2)
BASOPHILS NFR BLD: 1 %
EOSINOPHIL # BLD: 0.1 K/UL (ref 0–0.7)
EOSINOPHIL NFR BLD: 3 %
ERYTHROCYTE [DISTWIDTH] IN BLOOD BY AUTOMATED COUNT: 14 % (ref 11–15)
HCT VFR BLD AUTO: 35.8 % (ref 41–52)
HGB BLD-MCNC: 12.6 G/DL (ref 13.5–17.5)
LYMPHOCYTES # BLD: 2 K/UL (ref 1–4)
LYMPHOCYTES NFR BLD: 46 %
MCH RBC QN AUTO: 32.6 PG (ref 27–32)
MCHC RBC AUTO-ENTMCNC: 35.1 G/DL (ref 32–37)
MCV RBC AUTO: 92.9 FL (ref 80–100)
MONOCYTES # BLD: 0.3 K/UL (ref 0–1)
MONOCYTES NFR BLD: 7 %
NEUTROPHILS # BLD AUTO: 1.8 K/UL (ref 1.8–7.7)
NEUTROPHILS NFR BLD: 43 %
PLATELET # BLD AUTO: 175 K/UL (ref 140–400)
PMV BLD AUTO: 7.6 FL (ref 7.4–10.3)
RBC # BLD AUTO: 3.85 M/UL (ref 4.5–5.9)
WBC # BLD AUTO: 4.3 K/UL (ref 4–11)

## 2018-08-21 PROCEDURE — 99214 OFFICE O/P EST MOD 30 MIN: CPT | Performed by: INTERNAL MEDICINE

## 2018-08-21 NOTE — PROGRESS NOTES
HOME     Kimmy Gamino is a 81year old male here for f/u of BMBx with new dx of Smoldering multiple myeloma (smm) (hcc)  (primary encounter diagnosis)    States feeling OK. Wife asked permission to record the discussion.   States SOB when he goes swimmi week         Drug use: No    Sexual activity: Not on file     Other Topics Concern     Service No    Caffeine Concern Yes    Comment: Coffee and Soda    Occupational Exposure No     Social History Narrative   None on file     Family History   Probl PROTEIN ELECTROPHORESIS [E]      IMMUNOGLOBULIN A/G/M, QUANT [E]      IMMUNOFIXATION (KEVAN) [E]      IMMUNOGLOBULIN FREE LT CHAINS BLOOD [E]      Discussed with the patient that patients can be in smoldering myeloma phase for years prior to needing initiati Provider:  Evelia Woods MD         Collected:           07/31/2018 09:38 AM         Ordering Location:     [facility] [place] [facility]          Received:            07/31/2018 10:12 AM                               Laboratory iron with minimal sideroblastic iron present; no pathologic ring sideroblast forms identified.    · Most recently available complete blood cell count with differential data (06/25/2018) demonstrates mild monochromic monocytic anemia (HgB - 12.6 g/dl, HCT - be the presence of a significant marrow space plasmacytosis.  Although plasma cell comprise 20% of marrow space cells on 2500-cell differential of stained bone marrow aspirate smears, multifocal scattered marrow space plasma cell aggregates ( ranging from a chain restriction and with a subpopulation of cells showing dim expression of CD56.       Further examination of lymphoid cells demonstrates a small mixed population (approximately 9% of total cells analyzed on CD45 versus side light scatter gating) charact bone marrow morphologic and immunophenotypic findings are consistent with involvement by a Plasma Cell Neoplasm with features currently best classified as Smoldering Plasma Cell Myeloma; however, close clinical follow-up is recommended to monitor for disea levoscoliosis. RIBS:    No osteolytic or osteoblastic lesions identified. BONY PELVIS:  No osteolytic or osteoblastic lesions identified. Mild degenerative change in left greater than right hip and SI joints.    HUMERI:           No osteolytic or osteo 11.0 - 15.0 %   14.0   Platelet Count      491 - 400 K/UL   157   MEAN PLATELET VOLUME      7.4 - 10.3 fL   7.8   Neutrophils %      %   43   Lymphocytes %      %   45   Monocytes %      %   7   Eosinophils %      %   4   Basophils %      %   1   Neutrop

## 2018-09-18 ENCOUNTER — NURSE ONLY (OUTPATIENT)
Dept: INTERNAL MEDICINE CLINIC | Facility: CLINIC | Age: 73
End: 2018-09-18

## 2018-09-18 PROCEDURE — G0008 ADMIN INFLUENZA VIRUS VAC: HCPCS | Performed by: INTERNAL MEDICINE

## 2018-09-18 PROCEDURE — 90653 IIV ADJUVANT VACCINE IM: CPT | Performed by: INTERNAL MEDICINE

## 2018-10-14 RX ORDER — AMLODIPINE BESYLATE 10 MG/1
TABLET ORAL
Qty: 90 TABLET | Refills: 1 | Status: SHIPPED | OUTPATIENT
Start: 2018-10-14

## 2018-11-06 ENCOUNTER — LAB ENCOUNTER (OUTPATIENT)
Dept: LAB | Age: 73
End: 2018-11-06
Attending: INTERNAL MEDICINE
Payer: MEDICARE

## 2018-11-06 DIAGNOSIS — E53.8 VITAMIN B12 DEFICIENCY: ICD-10-CM

## 2018-11-06 DIAGNOSIS — I10 ESSENTIAL HYPERTENSION: ICD-10-CM

## 2018-11-06 DIAGNOSIS — R73.03 PREDIABETES: ICD-10-CM

## 2018-11-06 DIAGNOSIS — E78.00 HYPERCHOLESTEROLEMIA: ICD-10-CM

## 2018-11-06 DIAGNOSIS — R73.9 HYPERGLYCEMIA: ICD-10-CM

## 2018-11-06 DIAGNOSIS — R97.20 ELEVATED PSA: ICD-10-CM

## 2018-11-06 PROCEDURE — 84153 ASSAY OF PSA TOTAL: CPT

## 2018-11-06 PROCEDURE — 83036 HEMOGLOBIN GLYCOSYLATED A1C: CPT

## 2018-11-06 PROCEDURE — 80053 COMPREHEN METABOLIC PANEL: CPT

## 2018-11-06 PROCEDURE — 80061 LIPID PANEL: CPT

## 2018-11-06 PROCEDURE — 82607 VITAMIN B-12: CPT

## 2018-11-06 PROCEDURE — 85025 COMPLETE CBC W/AUTO DIFF WBC: CPT

## 2018-11-06 PROCEDURE — 36415 COLL VENOUS BLD VENIPUNCTURE: CPT

## 2018-11-07 ENCOUNTER — LAB ENCOUNTER (OUTPATIENT)
Dept: LAB | Facility: HOSPITAL | Age: 73
End: 2018-11-07
Attending: INTERNAL MEDICINE
Payer: MEDICARE

## 2018-11-07 DIAGNOSIS — D47.2 SMOLDERING MULTIPLE MYELOMA (SMM): ICD-10-CM

## 2018-11-07 PROCEDURE — 83883 ASSAY NEPHELOMETRY NOT SPEC: CPT

## 2018-11-07 PROCEDURE — 80053 COMPREHEN METABOLIC PANEL: CPT

## 2018-11-07 PROCEDURE — 86334 IMMUNOFIX E-PHORESIS SERUM: CPT

## 2018-11-07 PROCEDURE — 82232 ASSAY OF BETA-2 PROTEIN: CPT

## 2018-11-07 PROCEDURE — 82784 ASSAY IGA/IGD/IGG/IGM EACH: CPT

## 2018-11-07 PROCEDURE — 83615 LACTATE (LD) (LDH) ENZYME: CPT

## 2018-11-07 PROCEDURE — 36415 COLL VENOUS BLD VENIPUNCTURE: CPT

## 2018-11-07 PROCEDURE — 85025 COMPLETE CBC W/AUTO DIFF WBC: CPT

## 2018-11-07 PROCEDURE — 84165 PROTEIN E-PHORESIS SERUM: CPT

## 2018-11-08 ENCOUNTER — LAB ENCOUNTER (OUTPATIENT)
Dept: LAB | Facility: HOSPITAL | Age: 73
End: 2018-11-08
Attending: INTERNAL MEDICINE
Payer: MEDICARE

## 2018-11-08 DIAGNOSIS — D47.2 SMOLDERING MULTIPLE MYELOMA (SMM): ICD-10-CM

## 2018-11-08 PROCEDURE — 86335 IMMUNFIX E-PHORSIS/URINE/CSF: CPT

## 2018-11-08 PROCEDURE — 84156 ASSAY OF PROTEIN URINE: CPT

## 2018-11-08 PROCEDURE — 84166 PROTEIN E-PHORESIS/URINE/CSF: CPT

## 2018-11-14 ENCOUNTER — OFFICE VISIT (OUTPATIENT)
Dept: INTERNAL MEDICINE CLINIC | Facility: CLINIC | Age: 73
End: 2018-11-14

## 2018-11-14 VITALS
HEART RATE: 82 BPM | BODY MASS INDEX: 33.62 KG/M2 | SYSTOLIC BLOOD PRESSURE: 132 MMHG | HEIGHT: 69 IN | TEMPERATURE: 98 F | WEIGHT: 227 LBS | DIASTOLIC BLOOD PRESSURE: 78 MMHG | OXYGEN SATURATION: 98 %

## 2018-11-14 DIAGNOSIS — Z00.00 ROUTINE HEALTH MAINTENANCE: Primary | ICD-10-CM

## 2018-11-14 DIAGNOSIS — R73.03 PREDIABETES: ICD-10-CM

## 2018-11-14 DIAGNOSIS — I10 ESSENTIAL HYPERTENSION: ICD-10-CM

## 2018-11-14 DIAGNOSIS — E53.8 VITAMIN B12 DEFICIENCY: ICD-10-CM

## 2018-11-14 DIAGNOSIS — D47.2 SMOLDERING MULTIPLE MYELOMA (SMM): ICD-10-CM

## 2018-11-14 DIAGNOSIS — E78.00 HYPERCHOLESTEROLEMIA: ICD-10-CM

## 2018-11-14 DIAGNOSIS — Z86.010 HISTORY OF ADENOMATOUS POLYP OF COLON: ICD-10-CM

## 2018-11-14 DIAGNOSIS — R73.9 HYPERGLYCEMIA: ICD-10-CM

## 2018-11-14 DIAGNOSIS — R06.00 DYSPNEA ON EXERTION: ICD-10-CM

## 2018-11-14 PROCEDURE — 93005 ELECTROCARDIOGRAM TRACING: CPT | Performed by: INTERNAL MEDICINE

## 2018-11-14 PROCEDURE — 93000 ELECTROCARDIOGRAM COMPLETE: CPT | Performed by: INTERNAL MEDICINE

## 2018-11-14 PROCEDURE — 99214 OFFICE O/P EST MOD 30 MIN: CPT | Performed by: INTERNAL MEDICINE

## 2018-11-14 NOTE — PROGRESS NOTES
Ericka Adam is a 72year old malePatient presents with:  Checkup    HPI:     Ericka Adam is a 72year old male who presents for a 6 month check up. He and his wife are in the process of moving to [place]; leaving on Sunday.   They bought the cancer) Mother 61   • Thyroid disease Sister    • Depression Brother    • Diabetes Brother    • No Known Problems Maternal Grandmother    • Other (accident) Maternal Grandfather    • Other (Pancreatic Cancer) Paternal Grandmother    • Crohn's Disease Kathleen Baumann Colorectal Cancer Screening      Colonoscopy Screen every 10 years Colonoscopy due on 07/27/2020 Update [facility] if applicable    Flex Sigmoidoscopy Screen every 5 years No results found for this or any previous visit. No flowsheet data found. 11/06/2018 28    No flowsheet data found. Dilated Eye exam  Annually No flowsheet data found. No flowsheet data found. COPD      Spirometry Testing Annually No results found for this or any previous visit. No flowsheet data found.         REVIEW O 12/20/17. Reminded to get Shingrix shingles vaccine. Had flu shot last week.      Hx of adenomatous colon polyps  Colonoscopy by Dr. Shivani Hsieh on 7/27/17 -- diverticulosis and 2 adenomatous polyps. Repeat in 5 years (2022).      Smoldering multiple myeloma

## 2018-11-15 ENCOUNTER — OFFICE VISIT (OUTPATIENT)
Dept: HEMATOLOGY/ONCOLOGY | Facility: HOSPITAL | Age: 73
End: 2018-11-15
Attending: INTERNAL MEDICINE
Payer: MEDICARE

## 2018-11-15 VITALS
HEART RATE: 70 BPM | HEIGHT: 69 IN | RESPIRATION RATE: 18 BRPM | SYSTOLIC BLOOD PRESSURE: 142 MMHG | TEMPERATURE: 98 F | BODY MASS INDEX: 33.62 KG/M2 | WEIGHT: 227 LBS | DIASTOLIC BLOOD PRESSURE: 71 MMHG

## 2018-11-15 DIAGNOSIS — D47.2 SMOLDERING MULTIPLE MYELOMA (SMM): Primary | ICD-10-CM

## 2018-11-15 PROCEDURE — 99213 OFFICE O/P EST LOW 20 MIN: CPT | Performed by: INTERNAL MEDICINE

## 2018-11-15 NOTE — PROGRESS NOTES
HPI       Shakira Ulloa is a 72year old male here for f/u of  Smoldering multiple myeloma (smm) (hcc)  (primary encounter diagnosis)    Active but states some decreased activity. States Dr. Arabella Maria ordered cardiac w/u.     States dizzy with changing i Comment:Lip and facial swelling with fish    Past Medical History:   Diagnosis Date   • Epistaxis    • Essential hypertension    • Hyperlipidemia    • MGUS (monoclonal gammopathy of unknown significance) 7/27/2018   • Tubular adenoma of colon 2017     Past Relation Age of Onset   • No Known Problems Father    • Other (Pancreatic cancer) Mother 61   • Thyroid disease Sister    • Depression Brother    • Diabetes Brother    • No Known Problems Maternal Grandmother    • Other (accident) Maternal Grandfather    • that standard of care is observation.       Discussed that there are available clinical trials addressing the question of treating smoldering myeloma early vs at time of CRAB criteria.   We do not have any available trials but will look for these in the are 3.1 (L)    GLOBULIN, TOTAL      2.5 - 3.7 g/dL 7.1 (H) 7.5 (H)    A/G RATIO      1.0 - 2.0 0.4 (L) 0.4 (L)    ANION GAP      0 - 18 mmol/L 5 3    BUN/CREA RATIO      10.0 - 20.0 13.8 9.6 (L)    CALCULATED OSMOLALITY      275 - 295 mOsm/kg 280 275    GFR, N 0.50 - 1.50 mg/dL    CALCIUM      8.5 - 10.5 mg/dL    ALT (SGPT)      17 - 63 U/L    AST (SGOT)      15 - 41 U/L    ALKALINE PHOSPHATASE      32 - 100 U/L    Total Bilirubin      0.3 - 1.2 mg/dL    TOTAL PROTEIN      5.9 - 8.4 g/dL    Albumin      3.5 - 4. low    Gamma Globulins 0.62 - 1.70 g/dL 4.87 Abnormally high    Albumin/Globulin Ratio 1.00 - 2.00 0.57 Abnormally low    Monoclonal Quantitation g/dL 4.65    SPE Interpretation  Serum protein electrophoresis demonstrates an abnormal band in the gamma rigoberto

## 2020-11-24 ENCOUNTER — TELEPHONE (OUTPATIENT)
Dept: INTERNAL MEDICINE CLINIC | Facility: CLINIC | Age: 75
End: 2020-11-24

## 2020-11-24 NOTE — TELEPHONE ENCOUNTER
Spoke with patient's wife, Candi Daily, who reports patient was told by patient's myeloma specialist, Dr. Joe Richey, that patient should not see his wife for at least 21 days due to his history and her diagnosis with COVID.      Per Candi Daily, patient would like to kno

## 2020-11-24 NOTE — TELEPHONE ENCOUNTER
Refer to [facility] website -- pt to quarantine for 10 days after positive Covid test or at least 3 days after resolution of fever-- whichever is longest

## 2021-02-03 DIAGNOSIS — Z23 NEED FOR VACCINATION: ICD-10-CM

## 2024-10-16 ENCOUNTER — LAB REQUISITION (OUTPATIENT)
Dept: LAB | Age: 79
End: 2024-10-16

## 2024-10-16 DIAGNOSIS — C90.00 MULTIPLE MYELOMA NOT HAVING ACHIEVED REMISSION  (CMD): ICD-10-CM

## 2024-10-16 PROCEDURE — PSEU8178 BETA 2 MICROGLOBULIN: Performed by: CLINICAL MEDICAL LABORATORY

## 2024-10-16 PROCEDURE — 82232 ASSAY OF BETA-2 PROTEIN: CPT | Performed by: CLINICAL MEDICAL LABORATORY

## 2024-10-18 LAB — B2 MICROGLOB SERPL-MCNC: 3 MG/L (ref 1.2–3.5)

## 2025-01-21 ENCOUNTER — LAB REQUISITION (OUTPATIENT)
Dept: LAB | Age: 80
End: 2025-01-21

## 2025-01-21 DIAGNOSIS — C90.00 MULTIPLE MYELOMA NOT HAVING ACHIEVED REMISSION  (CMD): ICD-10-CM

## 2025-01-21 PROCEDURE — 82232 ASSAY OF BETA-2 PROTEIN: CPT | Performed by: CLINICAL MEDICAL LABORATORY

## 2025-01-21 PROCEDURE — PSEU8178 BETA 2 MICROGLOBULIN: Performed by: CLINICAL MEDICAL LABORATORY

## 2025-01-22 LAB — B2 MICROGLOB SERPL-MCNC: 3.5 MG/L (ref 1.2–3.5)

## 2025-04-21 ENCOUNTER — LAB REQUISITION (OUTPATIENT)
Dept: LAB | Age: 80
End: 2025-04-21

## 2025-04-21 DIAGNOSIS — C90.00 MULTIPLE MYELOMA NOT HAVING ACHIEVED REMISSION  (CMD): ICD-10-CM

## 2025-04-21 PROCEDURE — PSEU8178 BETA 2 MICROGLOBULIN: Performed by: CLINICAL MEDICAL LABORATORY

## 2025-04-21 PROCEDURE — 82232 ASSAY OF BETA-2 PROTEIN: CPT | Performed by: CLINICAL MEDICAL LABORATORY

## 2025-04-22 LAB — B2 MICROGLOB SERPL-MCNC: 3.7 MG/L (ref 1.2–3.5)

## 2025-07-15 ENCOUNTER — LAB REQUISITION (OUTPATIENT)
Dept: LAB | Age: 80
End: 2025-07-15

## 2025-07-15 DIAGNOSIS — C90.00 MULTIPLE MYELOMA NOT HAVING ACHIEVED REMISSION  (CMD): ICD-10-CM

## 2025-07-15 PROCEDURE — 82232 ASSAY OF BETA-2 PROTEIN: CPT | Performed by: CLINICAL MEDICAL LABORATORY

## 2025-07-15 PROCEDURE — PSEU8178 BETA 2 MICROGLOBULIN: Performed by: CLINICAL MEDICAL LABORATORY

## 2025-07-16 LAB — B2 MICROGLOB SERPL-MCNC: 3.5 MG/L (ref 1.2–3.5)

## (undated) NOTE — LETTER
Merna Chung, [address] [facility]       07/06/17        Patient: Lexus Melissa   YOB: 1945   Date of Visit: 7/6/2017       Dear  Dr. Mark Tipton MD,      Thank you for referring Lexus Melissa to my practice.   Kostas

## (undated) NOTE — MR AVS SNAPSHOT
[place]  [facility] [address] [address]  [phone]               Thank you for choosing us for your health care visit with Trell Mendez MD.  We are glad to serve you and happy to provide you with this summary of your visit.   [place] - [address] [facility], [phone], [address] [place] [address]. [address] [id number]     Phone:  [phone]    - AmLODIPine Besylate 5 MG Tabs            MyChart     Visit MyChart  You can access your MyChart

## (undated) NOTE — MR AVS SNAPSHOT
After Visit Summary   3/8/2017    Qi Dyer    MRN: [id number]           Visit Information        Provider Department Dept Phone    3/8/2017 11:30 AM Enoch Ford MD [facility] [phone]      Your Vitals Were     BP Pulse Temp(Sr [facility] now offers Video Visits through [address] for adult and pediatric patients. Video Visits are available Monday - Friday for many common conditions such as allergies, colds, cough, fever, rash, sore throat, headache and pink eye.   The cost for a Video Vi